# Patient Record
(demographics unavailable — no encounter records)

---

## 2017-08-21 NOTE — MM
Reason for exam: additional evaluation requested from prior study.

Last mammogram was performed 1 year ago.



History:

Patient is postmenopausal and has history of breast cancer at age 61.

Family history of breast cancer in paternal aunt at age 60, breast cancer in 

paternal aunt, and breast cancer in 2 maternal aunts.

Malignant right breast needle localzation of the right breast, September 18, 2009.

Lumpectomy of the right breast, September 18, 2009.  Malignant right mammotome 

panel of the right breast, August 31, 2009.  Benign excisional biopsy of the left 

breast, 1990.  Benign excisional biopsy of the right breast, 1980.  Benign 

excisional biopsy of the right breast, 1970.  Benign excisional biopsy of the left

breast, 1969.

Took estrogen for 9 years 10 months beginning at age 50.  Took progesterone for 9 

years 10 months beginning at age 50.  Took antineoplastic for 5 years beginning at

age 61.



Physical Findings:

Nurse did not find any significant physical abnormalities on exam.



MG 3D Diag Mammo W/Cad ODALYS

Bilateral CC and MLO view(s) were taken.

Prior study comparison: August 18, 2016, bilateral MG 3d diag mammo w/cad ODALYS.  

August 17, 2015, bilateral MG diagnostic mammo w CAD ODALSY.

There are scattered fibroglandular densities.  Surgical clips and sensity in the 

right upper outer quadrant, stable.

No significant new findings when compared with previous films.



These results were verbally communicated with the patient and result sheet given 

to the patient on 8/21/17.





ASSESSMENT: Benign, BI-RAD 2



RECOMMENDATION:

Follow-up diagnostic mammogram of both breasts in 1 year.